# Patient Record
Sex: MALE | Race: WHITE
[De-identification: names, ages, dates, MRNs, and addresses within clinical notes are randomized per-mention and may not be internally consistent; named-entity substitution may affect disease eponyms.]

---

## 2017-06-14 ENCOUNTER — HOSPITAL ENCOUNTER (EMERGENCY)
Dept: HOSPITAL 39 - ER | Age: 22
Discharge: HOME | End: 2017-06-14
Payer: SELF-PAY

## 2017-06-14 VITALS — SYSTOLIC BLOOD PRESSURE: 134 MMHG | DIASTOLIC BLOOD PRESSURE: 78 MMHG | OXYGEN SATURATION: 95 %

## 2017-06-14 VITALS — TEMPERATURE: 98.8 F

## 2017-06-14 DIAGNOSIS — J18.9: Primary | ICD-10-CM

## 2017-06-14 DIAGNOSIS — R94.31: ICD-10-CM

## 2017-06-14 DIAGNOSIS — F17.200: ICD-10-CM

## 2017-06-14 DIAGNOSIS — R09.1: ICD-10-CM

## 2017-06-14 PROCEDURE — 94640 AIRWAY INHALATION TREATMENT: CPT

## 2017-06-14 PROCEDURE — 71020: CPT

## 2017-06-14 NOTE — ED.PDOC
History of Present Illness





- General


Chief Complaint: Chest Pain/MI


Stated Complaint: Chest discomfort


Time Seen by Provider: 06/14/17 18:12


Source: patient


Exam Limitations: no limitations





- History of Present Illness


Initial Comments: 





he patient is a 21-year-old  male presenting to the emergency room 

secondary toacute onset left anteriorchest pain after bending over while at 

work.  the pain as pinching and pulling and stabbing.  It is worse with 

movement and taking a deep breath.  The patient also reports that for the last 

week he has had a productive sputum.  He has felt some malaise.  He does smoke 

significantly. No nausea or vomiting. No shortness of breath.  The patient 

additionally does have chest wall tenderness to palpation over the area of 

pain.  No palpitations.


Timing/Duration: 1-3 hours


Severity: moderate


Improving Factors: nothing


Worsening Factors: movement


Associated Symptoms: cough, malaise


Allergies/Adverse Reactions: 


Allergies





NO KNOWN ALLERGY Allergy (Verified 06/14/17 18:03)


 








Home Medications: 


Ambulatory Orders





levoFLOXacin [Levaquin] 500 mg PO DAILY #5 tab 06/14/17 











Review of Systems





- Review of Systems


Constitutional: States: malaise


EENTM: States: nose congestion


Respiratory: States: cough


Cardiology: States: chest pain


Gastrointestinal/Abdominal: States: no symptoms reported


Genitourinary: States: no symptoms reported


Musculoskeletal: States: no symptoms reported


Skin: States: no symptoms reported


Neurological: States: anxiety


Endocrine: States: no symptoms reported


All other Systems: No Change from Baseline





Past Medical History (General)





- Patient Medical History


Hx Stroke: No


Hx Congestive Heart Failure: No


Hx Diabetes: No


Hx MRSA: No


Surgical History: other





- Vaccination History


Hx Tetanus, Diphtheria Vaccination: No


Hx Influenza Vaccination: Yes - 2016


Hx Pneumococcal Vaccination: No





- Social History


Hx Tobacco Use: Yes


Hx Chewing Tobacco Use: No


Hx Alcohol Use: No


Hx Substance Use: No


Hx Substance Use Treatment: No


Hx Depression: No


Hx Physical Abuse: No


Hx Emotional Abuse: No


Hx Suspected Abuse: No





- Female History


Patient Pregnant: No





Family Medical History





- Family History


  ** Mother


Family History: No Known


Living Status: Still Living





Physical Exam





- Physical Exam


General Appearance: Alert, Comfortable, No apparent distress


Eye Exam: bilateral normal


Ears, Nose, Throat: hearing grossly normal, normal pharynx, nasal congestion


Neck: non-tender, full range of motion


Respiratory: lungs clear - with the exception of mild railsover the lower left 

anterior chest, no respiratory distress, no accessory muscle use, other - the 

patient does have chest wall tenderness to palpation.


Cardiovascular/Chest: normal peripheral pulses, regular rate, rhythm, no edema


Peripheral Pulses: radial,right: 2+, radial,left: 2+, dorsalis pedis,right: 2+, 

dorsalis pedis,left: 2+, posterior tibialis,right: 2+, posterior tibialis,left: 

2+


Gastrointestinal/Abdominal: non tender, soft


Rectal Exam: deferred


Back Exam: normal inspection, no CVA tenderness, no vertebral tenderness


Extremity: normal range of motion, non-tender, normal inspection, no pedal edema

, no calf tenderness, normal capillary refill


Neurologic: alert, normal mood/affect, oriented x 3


Skin Exam: normal color


Comments: 





 Vital Signs - 24 hr











  06/14/17 06/14/17





  18:03 18:42


 


Temperature 98.8 F 


 


Pulse Rate  82


 


Pulse Rate [ 109 H 





Left Radial]  


 


Respiratory 20 20





Rate  


 


Blood Pressure 130/83 





[Left Arm]  


 


O2 Sat by Pulse 93 L 93 L





Oximetry  














Progress





- Progress


Progress: 





06/14/17 18:49


the patient is a 21-year-old  male presenting with what appears to be 

pleuritic type chest pain.  He may have a small underlying lingular pneumonia 

based upon his history of a productive sputum and the fact that he smokes 

significantly.  He does need to stop smoking for now.  The patient be placed on 

Levaquin 500 milligrams daily for 5 days. Ibuprofen can be used for discomfort.

  He needs to make himself cough.  He needs to twist and turn to help reduce 

the pleurisy.  ER warnings were given for any worsening. He should follow-up 

with his primary care doctor towards the end of the week.





- Results/Orders


Results/Orders: 





he EKG shows mild sinus tachycardia.  Possible early right bundle branch block.

  No acute ST segment changes worrisome for ischemia. Slow R-wave progression 

in anterior leads.  These are consistent with previous EKG from 2014.





chest x-ray shows no definitive lobar pneumonia.  No congestive heart failure 

signs. No pneumothorax.





Departure





- Departure


Clinical Impression: 


 Pleurisy, Lingular pneumonia





Disposition: Discharge to Home or Self Care


Condition: Fair


Departure Forms:  ED Discharge - Pt. Copy, Patient Portal Self Enrollment


Instructions:  Positive Mental Health Changes Found After Smoking Cessation, DI 

for Atypical Pneumonia, DI for Pleurisy


Diet: regular diet


Activity: increase activity as tolerated - stop smoking


Prescriptions: 


levoFLOXacin [Levaquin] 500 mg PO DAILY #5 tab


Home Medications: 


Ambulatory Orders





levoFLOXacin [Levaquin] 500 mg PO DAILY #5 tab 06/14/17 








Additional Instructions: 


the patient is a 21-year-old  male presenting with what appears to be 

pleuritic type chest pain.  He may have a small underlying lingular pneumonia 

based upon his history of a productive sputum and the fact that he smokes 

significantly.  He does need to stop smoking for now.  The patient be placed on 

Levaquin 500 milligrams daily for 5 days. Ibuprofen can be used for discomfort.

  He needs to make himself cough.  He needs to twist and turn to help reduce 

the pleurisy.  ER warnings were given for any worsening. He should follow-up 

with his primary care doctor towards the end of the week.

## 2017-06-14 NOTE — RAD
EXAM DESCRIPTION:  Chest,2 Views



CLINICAL HISTORY:  21 years  Male  left anterior chest pain with

breathing and movement



COMPARISON:  January 15, 2014



FINDINGS: 



The cardiomediastinal silhouette appears unremarkable.

No consolidating infiltrates or pleural effusions.

No pneumothorax.  Small amount of atelectasis in the lung bases.



IMPRESSION: 



Stable appearance of the chest with the exception of small amount

of basilar atelectasis.



Electronically signed by:  Sarah Celaya  6/14/2017 6:43 PM CDT

Workstation: [id number]

## 2017-09-21 ENCOUNTER — HOSPITAL ENCOUNTER (EMERGENCY)
Dept: HOSPITAL 39 - ER | Age: 22
Discharge: HOME | End: 2017-09-21
Payer: SELF-PAY

## 2017-09-21 VITALS — SYSTOLIC BLOOD PRESSURE: 119 MMHG | DIASTOLIC BLOOD PRESSURE: 77 MMHG | OXYGEN SATURATION: 96 % | TEMPERATURE: 98.5 F

## 2017-09-21 DIAGNOSIS — Z87.891: ICD-10-CM

## 2017-09-21 DIAGNOSIS — M71.561: Primary | ICD-10-CM

## 2017-09-21 NOTE — ED.PDOC
History of Present Illness





- General


Chief Complaint: Lower Extremity Injury


Stated Complaint: rt knee pain


Time Seen by Provider: 09/21/17 22:37


Source: patient


Exam Limitations: no limitations





- History of Present Illness


Initial Comments: 





the patient is a 42-year-old male presenting to the emergency room secondary to 

mild to moderate discomfort to the anterior part of his knee for the last 3 

days.  Pain has been intermittent and fairly sharp.  No instability and no real 

recent injuries.  Examination of the knee shows pain over the anserine bursa.  

There is no bruising.  No knee instability.  No prepatellar pain and no 

patellar tendonitis


Timing/Duration: unsure


Severity: moderate


Improving Factors: nothing


Worsening Factors: movement


Associated Symptoms: denies symptoms


Allergies/Adverse Reactions: 


Allergies





NO KNOWN ALLERGY Allergy (Verified 06/14/17 18:03)


 








Home Medications: 


Ambulatory Orders





levoFLOXacin [Levaquin] 500 mg PO DAILY #5 tab 06/14/17 











Review of Systems





- Review of Systems


Constitutional: States: no symptoms reported


EENTM: States: no symptoms reported


Respiratory: States: no symptoms reported


Cardiology: States: no symptoms reported


Gastrointestinal/Abdominal: States: no symptoms reported


Genitourinary: States: no symptoms reported


Musculoskeletal: States: see HPI


Skin: States: no symptoms reported


Neurological: States: no symptoms reported


All other Systems: No Change from Baseline





Past Medical History (General)





- Patient Medical History


Hx Stroke: No


Hx Congestive Heart Failure: No


Hx Diabetes: No


Hx MRSA: No


Surgical History: tonsillectomy, other





- Vaccination History


Hx Tetanus, Diphtheria Vaccination: Yes - 2016


Hx Influenza Vaccination: No


Hx Pneumococcal Vaccination: No





- Social History


Hx Tobacco Use: Yes


Hx Chewing Tobacco Use: No


Hx Alcohol Use: Yes


Hx Substance Use: No


Hx Substance Use Treatment: No


Hx Depression: No


Hx Physical Abuse: No


Hx Emotional Abuse: No


Hx Suspected Abuse: No





- Female History


Patient Pregnant: No





Family Medical History





- Family History


  ** Mother


Family History: No Known


Living Status: Still Living





Physical Exam





- Physical Exam


General Appearance: Alert, Comfortable, No apparent distress


Eye Exam: bilateral normal


Ears, Nose, Throat: hearing grossly normal


Neck: full range of motion


Respiratory: no respiratory distress, no accessory muscle use


Cardiovascular/Chest: normal peripheral pulses, no edema


Peripheral Pulses: radial,right: 2+, radial,left: 2+, dorsalis pedis,right: 2+, 

dorsalis pedis,left: 2+


Rectal Exam: deferred


Extremity: normal range of motion, no pedal edema, no calf tenderness, normal 

capillary refill, other - see history of present illness


Neurologic: CNs II-XII nml as tested, alert, normal mood/affect, oriented x 3


Skin Exam: normal color


Comments: 





 Vital Signs - 24 hr











  09/21/17





  23:06


 


Temperature 98.5 F


 


Pulse Rate [ 103 H





left] 


 


Respiratory 18





Rate 


 


Blood Pressure 119/77





[left] 


 


O2 Sat by Pulse 96





Oximetry 














Progress





- Progress


Progress: 





09/21/17 23:20


the patient a 22-year-old male presenting with right knee anserine bursitis.  

The patient was given 1 dose of oral prednisone here today and he can take 2 

Aleve twice daily for the next week.  He should expect some discomfort for the 

next 2-3 weeks.  Topical heat or topical over-the-counter anti-inflammatories 

may also prove beneficial.  ER warnings were given.  He should follow-up with 

his primary care doctor in 2 weeks.





Departure





- Departure


Clinical Impression: 


 Anserine bursitis





Disposition: Discharge to Home or Self Care


Condition: Good


Departure Forms:  ED Discharge - Pt. Copy, Patient Portal Self Enrollment


Instructions:  DI for Leg Pain


Diet: regular diet


Activity: increase activity as tolerated


Home Medications: 


Ambulatory Orders





levoFLOXacin [Levaquin] 500 mg PO DAILY #5 tab 06/14/17 








Additional Instructions: 


the patient a 22-year-old male presenting with right knee anserine bursitis.  

The patient was given 1 dose of oral prednisone here today and he can take 2 

Aleve twice daily for the next week.  He should expect some discomfort for the 

next 2-3 weeks.  Topical heat or topical over-the-counter anti-inflammatories 

may also prove beneficial.  ER warnings were given.  He should follow-up with 

his primary care doctor in 2 weeks.

## 2020-11-18 ENCOUNTER — HOSPITAL ENCOUNTER (EMERGENCY)
Dept: HOSPITAL 39 - ER | Age: 25
Discharge: HOME | End: 2020-11-18
Payer: MEDICAID

## 2020-11-18 VITALS — SYSTOLIC BLOOD PRESSURE: 135 MMHG | OXYGEN SATURATION: 99 % | DIASTOLIC BLOOD PRESSURE: 89 MMHG

## 2020-11-18 VITALS — TEMPERATURE: 98.6 F

## 2020-11-18 DIAGNOSIS — R06.02: ICD-10-CM

## 2020-11-18 DIAGNOSIS — Z20.828: ICD-10-CM

## 2020-11-18 DIAGNOSIS — Z87.891: ICD-10-CM

## 2020-11-18 DIAGNOSIS — R05: Primary | ICD-10-CM

## 2020-11-18 DIAGNOSIS — R06.2: ICD-10-CM

## 2020-11-18 DIAGNOSIS — R00.0: ICD-10-CM

## 2020-11-18 PROCEDURE — 87635 SARS-COV-2 COVID-19 AMP PRB: CPT

## 2020-11-18 PROCEDURE — 93005 ELECTROCARDIOGRAM TRACING: CPT

## 2020-11-18 PROCEDURE — 71045 X-RAY EXAM CHEST 1 VIEW: CPT

## 2020-11-18 NOTE — ED.PDOC
History of Present Illness





- General


Chief Complaint: General


Stated Complaint: SOB AND LOW O2 SAT


Time Seen by Provider: 11/18/20 15:58


Source: patient, RN notes reviewed, Vital Signs reviewed


Exam Limitations: no limitations





- History of Present Illness


Comments: 





26 yo otherwise healthy male comes in from urgent care with cough, shortness of 

breath.  Wife was recently diagnosed with covid.  Patient states he was tested 

Monday and was negative.  Per [facility] Oxygen salutation was 90%, so sent here

for further evaluation.  Upon arrival patient saturating 100% on RA. denies 

recent travel, surgery or immobilization, no family hx of blood clots.


Allergies/Adverse Reactions: 


Allergies





NO KNOWN ALLERGY Allergy (Verified 11/18/20 16:18)


   








Review of Systems





- Review of Systems


Constitutional: Denies: chills, fever, malaise


EENTM: Denies: nose congestion, throat pain, mouth pain


Respiratory: States: cough, short of breath, wheezing


Cardiology: Denies: chest pain, palpitations, syncope


Gastrointestinal/Abdominal: Denies: abdominal pain, nausea, vomiting


Genitourinary: Denies: frequency, hematuria


Musculoskeletal: Denies: joint pain, muscle pain


Skin: Denies: rash


Neurological: Denies: headache, numbness, paresthesia, tremors, weakness


Endocrine: Denies: unexplained weight gain, unexplained weight loss


Hematologic/Lymphatic: Denies: easy bleeding, easy bruising





Past Medical History (General)





- Patient Medical History


Hx Seizures: No


Hx Stroke: No


Hx Dementia: No


Hx Asthma: No


Hx of COPD: No


Hx Cardiac Disorders: No


Hx Congestive Heart Failure: No


Hx Pacemaker: No


Hx Hypertension: No


Hx Thyroid Disease: No


Hx Diabetes: No


Hx Gastroesophageal Reflux: No


Hx Renal Disease: No


Hx MRSA: No





- Vaccination History


Hx Tetanus, Diphtheria Vaccination: No


Hx Influenza Vaccination: Yes


Hx Pneumococcal Vaccination: No





- Social History


Hx Tobacco Use: Yes


Hx Chewing Tobacco Use: No


Hx Alcohol Use: Yes - OCC


Hx Substance Use: No


Hx Substance Use Treatment: No


Hx Depression: No


Hx Physical Abuse: No


Hx Emotional Abuse: No


Hx Suspected Abuse: No





- Female History


Patient Pregnant: No





Family Medical History





- Family History


  ** Mother


Family History: No Known


Living Status: Still Living





Physical Exam





- Physical Exam


General Appearance: Alert, Comfortable, No apparent distress, Well Developed, 

Well Groomed, Well Hydrated, Well Nourished


Eye Exam: bilateral normal


ENT Exam: normal ENT inspection, hearing grossly normal, TMs normal


Neck: non-tender, full range of motion, supple, normal inspection, trachea 

midline


Respiratory: chest non-tender, lungs clear, normal breath sounds, no respiratory

distress, no accessory muscle use


Cardiovascular/Chest: normal peripheral pulses, no edema, no gallop, no JVD, no 

murmur, tachycardia


Gastrointestinal/Abdominal: normal bowel sounds, non tender, soft, no 

organomegaly, no pulsatile mass


Extremity: normal range of motion, non-tender, normal inspection, no pedal 

edema, no calf tenderness, normal capillary refill


Neurologic: CNs II-XII nml as tested, no motor/sensory deficits, alert, normal 

mood/affect, oriented x 3


Skin Exam: normal color, warm/dry





Progress





- Progress


Progress: 





11/18/20 17:02 patient states he can breath better after getting covid test. 


patient tachycardic, states he drinks root beer and occasional energy drink. 

denies palpations or chest pain. I recommend we check some blood work including 

a d-dimer as his covid is negative and part of the differential was PE. Patient 

refused labs.  The data reviewed when caring for this patient included: nurse 

notes, prior records, etc. The history and assessments from nurses notes were 

reviewed and considered, and the patient's home medication list was also 

reviewed and considered. My assessment and the results of testing completed 

here in the ED were discussed with the patient. All questions were answered, and

they express understanding of my assessment and the plan. They have been 

instructed to return if their symptoms worsen, and have been asked to follow up 

with their primary care physician to recheck today's presenting complaint. 

Strict return precautions given. 





Janel Camara DO #[id number]














- EKG/XRAY/CT


EKG: Sinus, Tachy, Unchanged from - 6/2017


XRAY: chest - no acute cardiopulmonary process.





Departure





- Departure


Clinical Impression: 


 Cough





Dyspnea


Qualifiers:


 Dyspnea type: unspecified Qualified Code(s): R06.00 - Dyspnea, unspecified





Time of Disposition: 17:07


Disposition: Discharge to Home or Self Care


Condition: Fair


Departure Forms:  ED Discharge - Pt. Copy, Patient Portal Self Enrollment


Instructions:  Smoking: Not Just Harmful to Your Lungs and Heart, Shortness of 

Breath (Dyspnea) (DC), Cough, Adult (DC), Breathing Exercises


Diet: resume usual diet


Activity: increase activity as tolerated


Referrals: 


Beth,Malini Maira, NP [Primary Care Provider] - 1-2 Days

## 2020-11-18 NOTE — RAD
EXAM DESCRIPTION: 



Chest,1 View



CLINICAL HISTORY: 

Cough



COMPARISON: 

June 14, 2017



TECHNIQUE: 

One view radiograph of the chest



FINDINGS: 

Cardiac silhouette shows normal heart size.

Pulmonary vascularity is within normal limits.

Lungs show no confluent infiltrates.

No pleural effusion. No pneumothorax.

No acute osseous abnormality.



IMPRESSION: 

No acute cardiopulmonary process.



Electronically signed by:  Burke Lee MD  11/18/2020 4:50 PM CST

Workstation: [id number]

## 2021-02-05 ENCOUNTER — HOSPITAL ENCOUNTER (EMERGENCY)
Dept: HOSPITAL 39 - ER | Age: 26
Discharge: HOME | End: 2021-02-05
Payer: MEDICAID

## 2021-02-05 VITALS — SYSTOLIC BLOOD PRESSURE: 118 MMHG | DIASTOLIC BLOOD PRESSURE: 82 MMHG

## 2021-02-05 VITALS — TEMPERATURE: 97.8 F

## 2021-02-05 VITALS — OXYGEN SATURATION: 97 %

## 2021-02-05 DIAGNOSIS — Z86.16: ICD-10-CM

## 2021-02-05 DIAGNOSIS — L03.211: Primary | ICD-10-CM

## 2021-02-05 DIAGNOSIS — R05: ICD-10-CM

## 2021-02-05 DIAGNOSIS — B95.62: ICD-10-CM

## 2021-02-05 PROCEDURE — 87070 CULTURE OTHR SPECIMN AEROBIC: CPT

## 2021-02-05 PROCEDURE — 70482 CT ORBIT/EAR/FOSSA W/O&W/DYE: CPT

## 2021-02-05 PROCEDURE — 80053 COMPREHEN METABOLIC PANEL: CPT

## 2021-02-05 PROCEDURE — 36415 COLL VENOUS BLD VENIPUNCTURE: CPT

## 2021-02-05 PROCEDURE — 85025 COMPLETE CBC W/AUTO DIFF WBC: CPT

## 2021-02-05 NOTE — CT
EXAM DESCRIPTION: Orbits w/wo Contrast 2/5/2021 9:19 PM CST



CLINICAL HISTORY: 25 years, Male, Infection



COMPARISON: None. 



PROCEDURE:



Multiple transaxial tomograms of the orbits were performed

utilizing 2 mm slice thickness at 2 mm interval reconstruction

before and after the administration of IV contrast. In addition

2-D multiplanar reconstructions in the coronal and sagittal plane

were performed and reviewed.

An individualized dose optimization technique, Automated Exposure

Control, was utilized for the performed procedure.



FINDINGS:



There is mucosal thickening of the frontal sinus,

ossifications/thickening ethmoid sinuses mucus retention cyst

right maxillary sinus measuring 2.5 cm and anterior inferior left

maxillary sinus measuring 1.6 cm with no evidence for significant

remodeling. There is soft tissue thickening and minimal

enhancement along the right frontal skin-deep subcutaneous tissue

extending into the right frontal supraorbital area, best

identified CT series #5 on image 14/[id number]/49.  No definitive

abscess formation and/or abnormal fluid collections identified.

The orbits, globes, lacrimal glands, intra and extraconal

elements demonstrate to be within normal limits.

The bony structures demonstrate no evidence for fractures.



IMPRESSION:



SKIN THICKENING WITH DEEP SUBCUTANEOUS TISSUE THICKENING AND

ENHANCEMENT ALONG THE RIGHT FRONTAL AREA EXTENDING INTO THE RIGHT

SUPRAORBITAL AREA SUGGESTING CELLULITIS. NO EVIDENCE FOR ABSCESS

FORMATION AND/OR SIGNIFICANT ABNORMALITIES.



Electronically signed by:  Burke Moss MD  2/5/2021 9:27 PM CST

Workstation: [id number]

## 2021-02-05 NOTE — ED.PDOC
History of Present Illness





- General


Chief Complaint: Eye Problems


Stated Complaint: Swollen eye


Time Seen by Provider: 21 19:43


Additional Information: 





Prior history of cellulitis and abscess





- History of Present Illness


Initial Comments: 





Patient states that earlier todayHe developed swelling of the right upper 

eyelid.  There has been no loss of vision.  No fever or chills.  Patient states 

that earlier there was a cyst on his forehead superior to the right eye which he

popped.


Timing/Duration: 24 hours


Severity: moderate


Improving Factors: nothing


Associated Symptoms: denies symptoms


Allergies/Adverse Reactions: 


Allergies





NO KNOWN ALLERGY Allergy (Verified 20 16:18)


   





Home Medications: 


Ambulatory Orders





Sulfamethoxazole-Trimethoprim [Sulfamethoxazole/Trimetho 800-160 mg] 1 tab PO 

BID 7 Days #14 tab 21 











Review of Systems





- Review of Systems


Constitutional: States: no symptoms reported


EENTM: States: other - No runny nose sore throat or other upper respiratory 

symptoms.  Eye symptoms as noted in HPI.


Respiratory: States: cough - Chronic and mild following COVID-19 infection about

1 year ago.


Cardiology: States: no symptoms reported


Gastrointestinal/Abdominal: States: no symptoms reported


Genitourinary: States: no symptoms reported


Musculoskeletal: States: no symptoms reported


Skin: States: other - Tender swollen red area of the forehead incised by the p

atient at home Earlier today


Neurological: States: no symptoms reported


Endocrine: States: no symptoms reported


Hematologic/Lymphatic: States: no symptoms reported





Past Medical History (General)





- Patient Medical History


Hx Seizures: No


Hx Stroke: No


Hx Dementia: No


Hx Asthma: No


Hx of COPD: No


Hx Cardiac Disorders: No


Hx Congestive Heart Failure: No


Hx Pacemaker: No


Hx Hypertension: No


Hx Thyroid Disease: No


Hx Diabetes: No


Hx Gastroesophageal Reflux: No


Hx Renal Disease: No


Hx MRSA: No





- Vaccination History


Hx Tetanus, Diphtheria Vaccination: No


Hx Influenza Vaccination: Yes


Hx Pneumococcal Vaccination: No





- Social History


Hx Tobacco Use: Yes


Hx Chewing Tobacco Use: No


Hx Alcohol Use: Yes - OCC


Hx Substance Use: No


Hx Substance Use Treatment: No


Hx Depression: No


Hx Physical Abuse: No


Hx Emotional Abuse: No


Hx Suspected Abuse: No





- Female History


Patient Pregnant: No





Family Medical History





- Family History


  ** Mother


Family History: No Known


Living Status: Still Living





Physical Exam





- Physical Exam


General Appearance: Alert, Comfortable


Eye Exam: right other - Upper eyelid is swollen soft nontender and slightly 

erythematous.  Visual acuities without correction 20/15 right and left.  No pain

or limitation of extraocular motions.


Ears, Nose, Throat: normal ENT inspection, normal pharynx


Neck: non-tender, full range of motion, supple


Respiratory: chest non-tender


Cardiovascular/Chest: normal peripheral pulses


Gastrointestinal/Abdominal: normal bowel sounds, non tender, soft


Back Exam: normal inspection, no CVA tenderness


Extremity: normal range of motion, non-tender, no calf tenderness


Neurologic: CNs II-XII nml as tested, no motor/sensory deficits, normal 

mood/affect, oriented x 3


Skin Exam: normal color, warm/dry, other - 4 cm tender indurated reddened 

circular area of the mid forehead to the right of midline.  There is a small 

draining wound with serous fluid draining.  The area is minimally tender and 

there is no palpable fluctuance.


Lymphatic: no adenopathy





Progress





- Progress


Progress: 





21 21:52


Clindamycin 900 mg IV.  Septra DS 2 tablets by mouth.


21 01:53





Medical decision makin-year-old male with history of Cellulitis and 

abscesses complaining of swelling of the left upper eyelid following self 

lancing of a swollen area of infection on his forehead.  Patient does not have 

any loss of vision or loss of extraocular motion 





- Results/Orders


Results/Orders: 





EXAM DESCRIPTION: Orbits w/wo Contrast 2021 9:19 PM CST  CLINICAL HISTORY: 

25 years, Male, Infection  COMPARISON: None.  PROCEDURE:  Multiple transaxial 

tomograms of the orbits were performed utilizing 2 mm slice thickness at 2 mm 

interval reconstruction before and after the administration of IV contrast. In 

addition 2-D multiplanar reconstructions in the coronal and sagittal plane were 

performed and reviewed. An individualized dose optimization technique, Automated

Exposure Control, was utilized for the performed procedure.  FINDINGS:  There is

mucosal thickening of the frontal sinus, ossifications/thickening ethmoid 

sinuses mucus retention cyst right maxillary sinus measuring 2.5 cm and anterior

inferior left maxillary sinus measuring 1.6 cm with no evidence for significant 

remodeling. There is soft tissue thickening and minimal enhancement along the 

right frontal skin-deep subcutaneous tissue extending into the right frontal 

supraorbital area, best identified CT series #5 on image -. No 

definitive abscess formation and/or abnormal fluid collections identified. The 

orbits, globes, lacrimal glands, intra and extraconal elements demonstrate to be

within normal limits. The bony structures demonstrate no evidence for fractures.

 IMPRESSION:  SKIN THICKENING WITH DEEP SUBCUTANEOUS TISSUE THICKENING AND 

ENHANCEMENT ALONG THE RIGHT FRONTAL AREA EXTENDING INTO THE RIGHT SUPRAORBITAL 

AREA SUGGESTING CELLULITIS. NO EVIDENCE FOR ABSCESS FORMATION AND/OR SIGNIFICANT

ABNORMALITIES.  Electronically signed by: Burke Moss MD 2021 9:27 PM CST 

Workstation: [id number]





                                        





21 20:11


WOUND CULTURE Stat 





21 20:27


Hold Metformin x 48Hrs [id number] 





21 21:39


Clindamycin Inj (Vial) [Cleocin Inj (Vial)] 900 mg   Sodium Chloride 0.9% 50Ml 

[NS 50ml] 50 ml IVPB ONCE 








                         Laboratory Results - last 24 hr











  21





  20:03 20:03


 


WBC  17.0 H 


 


RBC  5.31 


 


Hgb  15.3 


 


Hct  45.4 


 


MCV  85.6 


 


MCH  28.9 


 


MCHC  33.8 


 


RDW  13.9 


 


Plt Count  300 


 


MPV  8.3 


 


Absolute Neuts (auto)  11.70 H 


 


Absolute Lymphs (auto)  3.10 


 


Absolute Monos (auto)  1.10 H 


 


Absolute Eos (auto)  1.00 H 


 


Absolute Basos (auto)  0.10 


 


Neutrophils %  69.0 


 


Lymphocytes %  18.1 L 


 


Monocytes %  6.3 


 


Eosinophils %  5.8 H 


 


Basophils %  0.8 


 


Sodium   136


 


Potassium   4.0


 


Chloride   98 L


 


Carbon Dioxide   28


 


Anion Gap   14.0


 


BUN   12


 


Creatinine   0.85


 


BUN/Creatinine Ratio   14.1


 


Random Glucose   182 H


 


Serum Osmolality   276.4


 


Calcium   9.3


 


Total Bilirubin   0.7


 


AST   20


 


ALT   37


 


Alkaline Phosphatase   58


 


Serum Total Protein   8.1


 


Albumin   4.3


 


Globulin   3.8 H


 


Albumin/Globulin Ratio   1.1











                               Vital Signs - 24 hr











  21





  19:13


 


Temperature 99.9 F H


 


Pulse Rate [ 121 H





left] 


 


Respiratory 20





Rate 


 


Blood Pressure 139/92





[Left Arm] 


 


O2 Sat by Pulse 97





Oximetry 














Departure





- Departure


Clinical Impression: 


 Cellulitis Of forehead, MRSA (methicillin resistant Staphylococcus aureus)





Time of Disposition: 21:53


Disposition: Discharge to Home or Self Care


Condition: Good


Departure Forms:  ED Discharge - Pt. Copy, Patient Portal Self Enrollment


Instructions:  DI for Eye Pain, Cellulitis (Skin Infection), Adult (DC)


Diet: resume usual diet


Referrals: 


Malini Campos NP [Primary Care Provider] - 1-2 Weeks


Prescriptions: 


Sulfamethoxazole-Trimethoprim [Sulfamethoxazole/Trimetho 800-160 mg] 1 tab PO 

BID 7 Days #14 tab


Home Medications: 


Ambulatory Orders





Sulfamethoxazole-Trimethoprim [Sulfamethoxazole/Trimetho 800-160 mg] 1 tab PO 

BID 7 Days #14 tab 21 








Comments: 





Apply warm soaks to the draining wound on your forehead several times a day to 

hasten resolution of the infection.  Do not poke rub or otherwise disturb the 

swollen and infected area.  If you develop increasing pain swelling redness of 

your right orbit then return to the emergency department.